# Patient Record
Sex: MALE | Race: WHITE | NOT HISPANIC OR LATINO | Employment: FULL TIME | URBAN - METROPOLITAN AREA
[De-identification: names, ages, dates, MRNs, and addresses within clinical notes are randomized per-mention and may not be internally consistent; named-entity substitution may affect disease eponyms.]

---

## 2018-01-11 ENCOUNTER — APPOINTMENT (OUTPATIENT)
Dept: RADIOLOGY | Facility: CLINIC | Age: 63
End: 2018-01-11
Payer: OTHER MISCELLANEOUS

## 2018-01-11 ENCOUNTER — TRANSCRIBE ORDERS (OUTPATIENT)
Dept: URGENT CARE | Facility: CLINIC | Age: 63
End: 2018-01-11

## 2018-01-11 ENCOUNTER — APPOINTMENT (OUTPATIENT)
Dept: URGENT CARE | Facility: CLINIC | Age: 63
End: 2018-01-11
Payer: OTHER MISCELLANEOUS

## 2018-01-11 ENCOUNTER — GENERIC CONVERSION - ENCOUNTER (OUTPATIENT)
Dept: OTHER | Facility: OTHER | Age: 63
End: 2018-01-11

## 2018-01-11 DIAGNOSIS — R52 PAIN: Primary | ICD-10-CM

## 2018-01-11 PROCEDURE — G0382 LEV 3 HOSP TYPE B ED VISIT: HCPCS

## 2018-01-11 PROCEDURE — 73060 X-RAY EXAM OF HUMERUS: CPT

## 2018-01-11 PROCEDURE — 99283 EMERGENCY DEPT VISIT LOW MDM: CPT

## 2018-01-17 ENCOUNTER — APPOINTMENT (OUTPATIENT)
Dept: URGENT CARE | Facility: CLINIC | Age: 63
End: 2018-01-17
Payer: OTHER MISCELLANEOUS

## 2018-01-17 PROCEDURE — 99213 OFFICE O/P EST LOW 20 MIN: CPT

## 2020-01-04 ENCOUNTER — HOSPITAL ENCOUNTER (EMERGENCY)
Facility: HOSPITAL | Age: 65
Discharge: HOME | End: 2020-01-04
Attending: EMERGENCY MEDICINE | Admitting: EMERGENCY MEDICINE
Payer: COMMERCIAL

## 2020-01-04 ENCOUNTER — OFFICE VISIT (OUTPATIENT)
Dept: URGENT CARE | Facility: CLINIC | Age: 65
End: 2020-01-04
Payer: COMMERCIAL

## 2020-01-04 VITALS
SYSTOLIC BLOOD PRESSURE: 213 MMHG | TEMPERATURE: 98.8 F | HEIGHT: 70 IN | WEIGHT: 186 LBS | BODY MASS INDEX: 26.63 KG/M2 | HEART RATE: 63 BPM | DIASTOLIC BLOOD PRESSURE: 102 MMHG

## 2020-01-04 VITALS
OXYGEN SATURATION: 98 % | BODY MASS INDEX: 26.69 KG/M2 | WEIGHT: 186 LBS | HEART RATE: 60 BPM | SYSTOLIC BLOOD PRESSURE: 182 MMHG | RESPIRATION RATE: 18 BRPM | DIASTOLIC BLOOD PRESSURE: 70 MMHG | TEMPERATURE: 98 F

## 2020-01-04 DIAGNOSIS — S09.93XA DENTAL INJURY, INITIAL ENCOUNTER: ICD-10-CM

## 2020-01-04 DIAGNOSIS — S01.511A LIP LACERATION, INITIAL ENCOUNTER: Primary | ICD-10-CM

## 2020-01-04 DIAGNOSIS — S01.511A LACERATION OF VERMILION BORDER OF UPPER LIP, INITIAL ENCOUNTER: Primary | ICD-10-CM

## 2020-01-04 PROCEDURE — 99203 OFFICE O/P NEW LOW 30 MIN: CPT | Performed by: PHYSICIAN ASSISTANT

## 2020-01-04 PROCEDURE — 90471 IMMUNIZATION ADMIN: CPT

## 2020-01-04 PROCEDURE — 90715 TDAP VACCINE 7 YRS/> IM: CPT | Performed by: EMERGENCY MEDICINE

## 2020-01-04 PROCEDURE — 99283 EMERGENCY DEPT VISIT LOW MDM: CPT

## 2020-01-04 PROCEDURE — 12052 INTMD RPR FACE/MM 2.6-5.0 CM: CPT | Performed by: EMERGENCY MEDICINE

## 2020-01-04 PROCEDURE — 99284 EMERGENCY DEPT VISIT MOD MDM: CPT | Performed by: EMERGENCY MEDICINE

## 2020-01-04 RX ORDER — AMLODIPINE BESYLATE 5 MG/1
TABLET ORAL EVERY 24 HOURS
COMMUNITY

## 2020-01-04 RX ORDER — LEVOTHYROXINE SODIUM 0.1 MG/1
TABLET ORAL
COMMUNITY
Start: 2019-10-11

## 2020-01-04 RX ORDER — LISINOPRIL 20 MG/1
20 TABLET ORAL DAILY
COMMUNITY

## 2020-01-04 RX ORDER — BUPIVACAINE HYDROCHLORIDE AND EPINEPHRINE 5; 5 MG/ML; UG/ML
1.8 INJECTION, SOLUTION EPIDURAL; INTRACAUDAL; PERINEURAL ONCE
Status: COMPLETED | OUTPATIENT
Start: 2020-01-04 | End: 2020-01-04

## 2020-01-04 RX ADMIN — TETANUS TOXOID, REDUCED DIPHTHERIA TOXOID AND ACELLULAR PERTUSSIS VACCINE, ADSORBED 0.5 ML: 5; 2.5; 8; 8; 2.5 SUSPENSION INTRAMUSCULAR at 15:27

## 2020-01-04 RX ADMIN — BUPIVACAINE HYDROCHLORIDE AND EPINEPHRINE BITARTRATE 1.8 ML: 5; .005 INJECTION, SOLUTION SUBCUTANEOUS at 15:27

## 2020-01-04 NOTE — DISCHARGE INSTRUCTIONS
Follow up with your dentist for further evaluation of the dental injury    You can also go to a [facility] location for suture removal

## 2020-01-04 NOTE — ED NOTES
Laceration to left upper lip approx 2 5 cm in length encompasing the [employer]  No active bleeding noted        Guerda Schmitz RN  01/04/20 8473

## 2020-01-04 NOTE — ED PROVIDER NOTES
History  Chief Complaint   Patient presents with    Lip Laceration     To ED from Urgent care for evaluation of upper lip laceration  Patient was hit in the face by metal today at noon  Denies any other injuries  50-year-old male presents from [facility] urgent care for further management of a lip laceration that he sustained around noon today  The patient states that he was working on a spring-loaded door and it struck him in the face  The patient sustained a laceration to his left upper lip  The patient denies any headache, loss of consciousness  The patient states that his left front tooth is mildly displaced however he pushed it back into place  He has minimal pain at this point  He states that it does not feel like it is moving any more  The bleeding was controlled direct pressure  The patient denies headache  Prior to Admission Medications   Prescriptions Last Dose Informant Patient Reported? Taking? amLODIPine (NORVASC) 5 mg tablet   Yes No   Sig: every 24 hours   levothyroxine 100 mcg tablet   Yes No   Sig: TAKE 1 TABLET ON AN EMPTY STOMACH IN THE MORNING 90   lisinopril (ZESTRIL) 20 mg tablet  Self Yes No   Sig: Take 20 mg by mouth daily      Facility-Administered Medications: None       Past Medical History:   Diagnosis Date    GERD (gastroesophageal reflux disease)     Hyperlipidemia     Hypertension        History reviewed  No pertinent surgical history  History reviewed  No pertinent family history  I have reviewed and agree with the history as documented  Social History     Tobacco Use    Smoking status: Never Smoker    Smokeless tobacco: Never Used   Substance Use Topics    Alcohol use: Yes     Comment: social    Drug use: Never        Review of Systems   HENT: Positive for dental problem  Skin: Positive for wound  Neurological: Negative for syncope and headaches  All other systems reviewed and are negative        Physical Exam  Physical Exam Constitutional: He appears well-developed and well-nourished  No distress  HENT:   Head: Normocephalic  Head is with laceration ( Left upper lip, not through and through there is 1 small intraoral superficial laceration)  Right Ear: External ear normal    Left Ear: External ear normal    Mouth/Throat: There is no maxillary instability   Eyes: Pupils are equal, round, and reactive to light  Conjunctivae and EOM are normal  No scleral icterus  Neck: Normal range of motion  Pulmonary/Chest: Effort normal  No respiratory distress  Musculoskeletal: Normal range of motion  Neurological: He is alert  Skin: Capillary refill takes less than 2 seconds  No rash noted  Psychiatric: He has a normal mood and affect  Nursing note and vitals reviewed  Vital Signs  ED Triage Vitals [01/04/20 1503]   Temperature Pulse Respirations Blood Pressure SpO2   98 °F (36 7 °C) 63 20 (!) 199/70 98 %      Temp Source Heart Rate Source Patient Position - Orthostatic VS BP Location FiO2 (%)   Tympanic Monitor Sitting Right arm --      Pain Score       No Pain           Vitals:    01/04/20 1503   BP: (!) 199/70   Pulse: 63   Patient Position - Orthostatic VS: Sitting         Visual Acuity      ED Medications  Medications   tetanus-diphtheria-acellular pertussis (BOOSTRIX) IM injection 0 5 mL (0 5 mL Intramuscular Given 1/4/20 1527)   bupivacaine-epinephrine (PF) (MARCAINE/EPINEPHRINE PF) 0 5 %-[id number] injection 1 8 mL (1 8 mL Infiltration Given 1/4/20 1527)       Diagnostic Studies  Results Reviewed     None                 No orders to display              Procedures  Laceration repair  Date/Time: 1/4/2020 4:12 PM  Performed by: Izaiah Engle DO  Authorized by: Izaiah Engle DO   Consent: Verbal consent obtained    Risks and benefits: risks, benefits and alternatives were discussed  Consent given by: patient  Patient identity confirmed: verbally with patient  Body area: head/neck  Location details: upper lip  Full thickness lip laceration: no  Vermilion border involved: yes  Laceration length: 3 cm  Anesthesia: nerve block    Anesthesia:  Local anesthetic: vivacaine  Anesthetic total: 1 8 mL    Sedation:  Patient sedated: no        Procedure Details:  Irrigation solution: saline  Irrigation method: syringe  Amount of cleaning: extensive  Skin closure: 6-0 nylon (6)  Subcutaneous closure: 6-0 fast-absorbing plain gut (2)  Number of sutures: 6  Technique: simple  Approximation: loose  Approximation difficulty: simple  Lip approximation: vermillion border well aligned  Patient tolerance: Patient tolerated the procedure well with no immediate complications               ED Course                               MDM  Number of Diagnoses or Management Options  Dental injury, initial encounter:   Lip laceration, initial encounter: new and requires workup  Diagnosis management comments: Plan is for repair of lip laceration  Outpatient dental follow-up    The patient (and any family present) verbalized understanding of the discharge instructions and warnings that would necessitate return to the Emergency Department  All questions were answered prior to discharge  Amount and/or Complexity of Data Reviewed  Review and summarize past medical records: yes (Urgent care note reviewed)          Disposition  Final diagnoses:   Lip laceration, initial encounter   Dental injury, initial encounter     Time reflects when diagnosis was documented in both MDM as applicable and the Disposition within this note     Time User Action Codes Description Comment    1/4/2020  4:14 PM Faustino Craig Add [[id number]] Lip laceration, initial encounter     1/4/2020  4:14 PM Faustino Craig Add [[id number]] Dental injury, initial encounter       ED Disposition     ED Disposition Condition Date/Time Comment    Discharge Stable Sat Jan 4, 2020  4:14 PM [address] discharge to home/self care              Follow-up Information     Follow up With Specialties Details Why Contact Info    Hao Deshpande MD [facility] Medicine Schedule an appointment as soon as possible for a visit in 1 week For suture removal [address] [facility], [address] [facility]  [phone]            Patient's Medications   Discharge Prescriptions    No medications on file     No discharge procedures on file      ED Provider  Electronically Signed by           Georgiana Lancaster DO  01/04/20 4517

## 2020-01-04 NOTE — PROGRESS NOTES
NAME: Corby Silva is a 62 y o  male  : 1955    MRN: [id number]      Assessment and Plan   Laceration of vermilion border of upper lip, initial encounter [[id number]]  1  Laceration of vermilion border of upper lip, initial encounter  Transfer to other facility   Considering the extent of laceration on the vermilion border inner lip advised patient to go to ED for proper closure  Patient will be driven by wife in personal vehicle  Darrell Burgess was seen today for laceration  Diagnoses and all orders for this visit:    Laceration of vermilion border of upper lip, initial encounter  -     Transfer to other facility        Patient Instructions   There are no Patient Instructions on file for this visit  Proceed to ER if symptoms worsen  Chief Complaint     Chief Complaint   Patient presents with    Laceration     PT WAS WORKING ON A DOOR AND THE DOOR SLIPPED AND HIT HIS UPPER LIP, LACERATION AND BLEEDING  O1-80         History of Present Illness     63 Yo old Pt presents  for laceration on left upper lip  x  1 day  Patient reports this morning he was working on a door when and the door slipped and hit his lip cutting his left side upper lip  Patient reports minimal pain presenting with active bleeding  Denies use of blood thinners  Denies numbess, tingling and weakness  LAST TETANUS SHOT = unknown      Review of Systems   Review of Systems   Constitutional: Negative  Respiratory: Negative  Cardiovascular: Negative  Skin: Positive for wound (Laceration of left side upper lip)  Current Medications       Current Outpatient Medications:     lisinopril (ZESTRIL) 20 mg tablet, Take 20 mg by mouth daily, Disp: , Rfl:     Current Allergies     Allergies as of 2020    (No Known Allergies)              No past medical history on file  No past surgical history on file  No family history on file  Medications have been verified      The following portions of the patient's history were reviewed and updated as appropriate: allergies, current medications, past family history, past medical history, past social history, past surgical history and problem list     Objective   BP (!) 213/102   Pulse 63   Temp 98 8 °F (37 1 °C)   Ht 5' 10" (1 778 m)   Wt 84 4 kg (186 lb)   BMI 26 69 kg/m²      Physical Exam     Physical Exam   Constitutional: He appears well-developed  No distress  HENT:   Mouth/Throat: Uvula is midline, oropharynx is clear and moist and mucous membranes are normal        Cardiovascular: Normal rate, regular rhythm, normal heart sounds and intact distal pulses  Exam reveals no gallop and no friction rub  No murmur heard  Pulmonary/Chest: Effort normal and breath sounds normal  No stridor  No respiratory distress  He has no wheezes  He has no rales  He exhibits no tenderness  Skin: He is not diaphoretic  Nursing note and vitals reviewed        Marci Hickman PA-C

## 2020-01-04 NOTE — ED NOTES
Laceration repair to upper lip by MD  Tolerated well  Wound cleansed       Emma Smith RN  01/04/20 5727

## 2020-09-22 ENCOUNTER — TRANSCRIBE ORDERS (OUTPATIENT)
Dept: ADMINISTRATIVE | Facility: HOSPITAL | Age: 65
End: 2020-09-22

## 2020-09-22 DIAGNOSIS — Z82.41 FAMILY HISTORY OF SUDDEN CARDIAC DEATH (SCD): ICD-10-CM

## 2020-09-22 DIAGNOSIS — R07.89 OTHER CHEST PAIN: Primary | ICD-10-CM

## 2020-10-20 ENCOUNTER — HOSPITAL ENCOUNTER (OUTPATIENT)
Dept: NON INVASIVE DIAGNOSTICS | Facility: HOSPITAL | Age: 65
Discharge: HOME | End: 2020-10-20
Payer: COMMERCIAL

## 2020-10-20 DIAGNOSIS — Z82.41 FAMILY HISTORY OF SUDDEN CARDIAC DEATH (SCD): ICD-10-CM

## 2020-10-20 DIAGNOSIS — R07.89 OTHER CHEST PAIN: ICD-10-CM

## 2020-10-20 PROCEDURE — 93306 TTE W/DOPPLER COMPLETE: CPT | Performed by: INTERNAL MEDICINE

## 2020-10-20 PROCEDURE — 93306 TTE W/DOPPLER COMPLETE: CPT

## 2020-10-22 ENCOUNTER — HOSPITAL ENCOUNTER (EMERGENCY)
Facility: HOSPITAL | Age: 65
Discharge: HOME | End: 2020-10-22
Attending: EMERGENCY MEDICINE | Admitting: EMERGENCY MEDICINE
Payer: COMMERCIAL

## 2020-10-22 ENCOUNTER — APPOINTMENT (EMERGENCY)
Dept: RADIOLOGY | Facility: HOSPITAL | Age: 65
End: 2020-10-22
Payer: COMMERCIAL

## 2020-10-22 VITALS
DIASTOLIC BLOOD PRESSURE: 78 MMHG | WEIGHT: 186 LBS | SYSTOLIC BLOOD PRESSURE: 148 MMHG | OXYGEN SATURATION: 97 % | TEMPERATURE: 97.5 F | HEART RATE: 54 BPM | RESPIRATION RATE: 13 BRPM | BODY MASS INDEX: 26.69 KG/M2

## 2020-10-22 DIAGNOSIS — R07.89 ATYPICAL CHEST PAIN: Primary | ICD-10-CM

## 2020-10-22 LAB
ANION GAP SERPL CALCULATED.3IONS-SCNC: 8 MMOL/L (ref 4–13)
ATRIAL RATE: 54 BPM
ATRIAL RATE: 69 BPM
BASOPHILS # BLD AUTO: 0.07 THOUSANDS/ΜL (ref 0–0.1)
BASOPHILS NFR BLD AUTO: 1 % (ref 0–1)
BUN SERPL-MCNC: 16 MG/DL (ref 5–25)
CALCIUM SERPL-MCNC: 8.6 MG/DL (ref 8.3–10.1)
CHLORIDE SERPL-SCNC: 104 MMOL/L (ref 100–108)
CO2 SERPL-SCNC: 29 MMOL/L (ref 21–32)
CREAT SERPL-MCNC: 1.15 MG/DL (ref 0.6–1.3)
EOSINOPHIL # BLD AUTO: 0.33 THOUSAND/ΜL (ref 0–0.61)
EOSINOPHIL NFR BLD AUTO: 5 % (ref 0–6)
ERYTHROCYTE [DISTWIDTH] IN BLOOD BY AUTOMATED COUNT: 12.3 % (ref 11.6–15.1)
GFR SERPL CREATININE-BSD FRML MDRD: 67 ML/MIN/1.73SQ M
GLUCOSE SERPL-MCNC: 110 MG/DL (ref 65–140)
HCT VFR BLD AUTO: 45.6 % (ref 36.5–49.3)
HGB BLD-MCNC: 16 G/DL (ref 12–17)
IMM GRANULOCYTES # BLD AUTO: 0.01 THOUSAND/UL (ref 0–0.2)
IMM GRANULOCYTES NFR BLD AUTO: 0 % (ref 0–2)
LYMPHOCYTES # BLD AUTO: 1.83 THOUSANDS/ΜL (ref 0.6–4.47)
LYMPHOCYTES NFR BLD AUTO: 30 % (ref 14–44)
MCH RBC QN AUTO: 29.6 PG (ref 26.8–34.3)
MCHC RBC AUTO-ENTMCNC: 35.1 G/DL (ref 31.4–37.4)
MCV RBC AUTO: 84 FL (ref 82–98)
MONOCYTES # BLD AUTO: 0.47 THOUSAND/ΜL (ref 0.17–1.22)
MONOCYTES NFR BLD AUTO: 8 % (ref 4–12)
NEUTROPHILS # BLD AUTO: 3.42 THOUSANDS/ΜL (ref 1.85–7.62)
NEUTS SEG NFR BLD AUTO: 56 % (ref 43–75)
NRBC BLD AUTO-RTO: 0 /100 WBCS
P AXIS: 74 DEGREES
P AXIS: 74 DEGREES
PLATELET # BLD AUTO: 148 THOUSANDS/UL (ref 149–390)
PMV BLD AUTO: 10.4 FL (ref 8.9–12.7)
POTASSIUM SERPL-SCNC: 3.4 MMOL/L (ref 3.5–5.3)
PR INTERVAL: 224 MS
PR INTERVAL: 258 MS
QRS AXIS: -21 DEGREES
QRS AXIS: -23 DEGREES
QRSD INTERVAL: 148 MS
QRSD INTERVAL: 156 MS
QT INTERVAL: 436 MS
QT INTERVAL: 454 MS
QTC INTERVAL: 430 MS
QTC INTERVAL: 467 MS
RBC # BLD AUTO: 5.4 MILLION/UL (ref 3.88–5.62)
SODIUM SERPL-SCNC: 141 MMOL/L (ref 136–145)
T WAVE AXIS: -13 DEGREES
T WAVE AXIS: 17 DEGREES
TROPONIN I SERPL-MCNC: <0.02 NG/ML
TROPONIN I SERPL-MCNC: <0.02 NG/ML
VENTRICULAR RATE: 54 BPM
VENTRICULAR RATE: 69 BPM
WBC # BLD AUTO: 6.13 THOUSAND/UL (ref 4.31–10.16)

## 2020-10-22 PROCEDURE — 84484 ASSAY OF TROPONIN QUANT: CPT | Performed by: EMERGENCY MEDICINE

## 2020-10-22 PROCEDURE — 80048 BASIC METABOLIC PNL TOTAL CA: CPT | Performed by: EMERGENCY MEDICINE

## 2020-10-22 PROCEDURE — 36415 COLL VENOUS BLD VENIPUNCTURE: CPT | Performed by: EMERGENCY MEDICINE

## 2020-10-22 PROCEDURE — 99285 EMERGENCY DEPT VISIT HI MDM: CPT | Performed by: EMERGENCY MEDICINE

## 2020-10-22 PROCEDURE — 85025 COMPLETE CBC W/AUTO DIFF WBC: CPT | Performed by: EMERGENCY MEDICINE

## 2020-10-22 PROCEDURE — 93010 ELECTROCARDIOGRAM REPORT: CPT | Performed by: INTERNAL MEDICINE

## 2020-10-22 PROCEDURE — 71045 X-RAY EXAM CHEST 1 VIEW: CPT

## 2020-10-22 PROCEDURE — 93005 ELECTROCARDIOGRAM TRACING: CPT

## 2020-10-22 PROCEDURE — 99284 EMERGENCY DEPT VISIT MOD MDM: CPT

## 2020-10-22 RX ORDER — LISINOPRIL AND HYDROCHLOROTHIAZIDE 25; 20 MG/1; MG/1
1 TABLET ORAL DAILY
COMMUNITY
Start: 2020-07-17

## 2020-11-09 ENCOUNTER — HOSPITAL ENCOUNTER (OUTPATIENT)
Dept: NON INVASIVE DIAGNOSTICS | Facility: CLINIC | Age: 65
Discharge: HOME | End: 2020-11-09
Payer: COMMERCIAL

## 2020-11-09 DIAGNOSIS — R07.89 ATYPICAL CHEST PAIN: ICD-10-CM

## 2020-11-09 LAB
CHEST PAIN STATEMENT: NORMAL
MAX DIASTOLIC BP: 100 MMHG
MAX HEART RATE: 141 BPM
MAX PREDICTED HEART RATE: 156 BPM
MAX. SYSTOLIC BP: 180 MMHG
PROTOCOL NAME: NORMAL
TARGET HR FORMULA: NORMAL
TEST INDICATION: NORMAL
TIME IN EXERCISE PHASE: NORMAL

## 2020-11-09 PROCEDURE — 93017 CV STRESS TEST TRACING ONLY: CPT

## 2020-11-09 PROCEDURE — 93018 CV STRESS TEST I&R ONLY: CPT | Performed by: INTERNAL MEDICINE

## 2020-11-09 PROCEDURE — 93016 CV STRESS TEST SUPVJ ONLY: CPT | Performed by: INTERNAL MEDICINE
